# Patient Record
Sex: MALE | Race: WHITE
[De-identification: names, ages, dates, MRNs, and addresses within clinical notes are randomized per-mention and may not be internally consistent; named-entity substitution may affect disease eponyms.]

---

## 2018-11-21 ENCOUNTER — HOSPITAL ENCOUNTER (INPATIENT)
Dept: HOSPITAL 72 - EMR | Age: 73
LOS: 6 days | Discharge: NURSING, REHAB OR LONG-TERM CARE | DRG: 637 | End: 2018-11-27
Payer: MEDICARE

## 2018-11-21 VITALS — DIASTOLIC BLOOD PRESSURE: 72 MMHG | SYSTOLIC BLOOD PRESSURE: 141 MMHG

## 2018-11-21 VITALS — SYSTOLIC BLOOD PRESSURE: 153 MMHG | DIASTOLIC BLOOD PRESSURE: 69 MMHG

## 2018-11-21 VITALS — SYSTOLIC BLOOD PRESSURE: 156 MMHG | DIASTOLIC BLOOD PRESSURE: 70 MMHG

## 2018-11-21 VITALS — WEIGHT: 158 LBS | HEIGHT: 69 IN | BODY MASS INDEX: 23.4 KG/M2

## 2018-11-21 VITALS — DIASTOLIC BLOOD PRESSURE: 76 MMHG | SYSTOLIC BLOOD PRESSURE: 117 MMHG

## 2018-11-21 DIAGNOSIS — K21.9: ICD-10-CM

## 2018-11-21 DIAGNOSIS — F29: ICD-10-CM

## 2018-11-21 DIAGNOSIS — Z79.4: ICD-10-CM

## 2018-11-21 DIAGNOSIS — F20.9: ICD-10-CM

## 2018-11-21 DIAGNOSIS — G93.41: ICD-10-CM

## 2018-11-21 DIAGNOSIS — G93.40: ICD-10-CM

## 2018-11-21 DIAGNOSIS — E87.1: ICD-10-CM

## 2018-11-21 DIAGNOSIS — R53.1: ICD-10-CM

## 2018-11-21 DIAGNOSIS — E03.9: ICD-10-CM

## 2018-11-21 DIAGNOSIS — E11.65: Primary | ICD-10-CM

## 2018-11-21 DIAGNOSIS — E78.5: ICD-10-CM

## 2018-11-21 LAB
ADD MANUAL DIFF: NO
ALBUMIN SERPL-MCNC: 3.4 G/DL (ref 3.4–5)
ALBUMIN/GLOB SERPL: 0.8 {RATIO} (ref 1–2.7)
ALP SERPL-CCNC: 106 U/L (ref 46–116)
ALT SERPL-CCNC: 32 U/L (ref 12–78)
ANION GAP SERPL CALC-SCNC: 10 MMOL/L (ref 5–15)
APPEARANCE UR: CLEAR
APTT PPP: (no result) S
AST SERPL-CCNC: 14 U/L (ref 15–37)
BASOPHILS NFR BLD AUTO: 1 % (ref 0–2)
BILIRUB SERPL-MCNC: 0.2 MG/DL (ref 0.2–1)
BUN SERPL-MCNC: 19 MG/DL (ref 7–18)
CALCIUM SERPL-MCNC: 8.9 MG/DL (ref 8.5–10.1)
CHLORIDE SERPL-SCNC: 97 MMOL/L (ref 98–107)
CK MB SERPL-MCNC: 1.2 NG/ML (ref 0–3.6)
CK SERPL-CCNC: 91 U/L (ref 26–308)
CO2 SERPL-SCNC: 25 MMOL/L (ref 21–32)
CREAT SERPL-MCNC: 1.2 MG/DL (ref 0.55–1.3)
EOSINOPHIL NFR BLD AUTO: 1.6 % (ref 0–3)
ERYTHROCYTE [DISTWIDTH] IN BLOOD BY AUTOMATED COUNT: 10.1 % (ref 11.6–14.8)
GLOBULIN SER-MCNC: 4.4 G/DL
GLUCOSE UR STRIP-MCNC: (no result) MG/DL
HCT VFR BLD CALC: 34.9 % (ref 42–52)
HGB BLD-MCNC: 12.5 G/DL (ref 14.2–18)
KETONES UR QL STRIP: NEGATIVE
LEUKOCYTE ESTERASE UR QL STRIP: NEGATIVE
LYMPHOCYTES NFR BLD AUTO: 31.9 % (ref 20–45)
MCV RBC AUTO: 84 FL (ref 80–99)
MONOCYTES NFR BLD AUTO: 6.5 % (ref 1–10)
NEUTROPHILS NFR BLD AUTO: 59 % (ref 45–75)
NITRITE UR QL STRIP: NEGATIVE
PH UR STRIP: 6 [PH] (ref 4.5–8)
PLATELET # BLD: 180 K/UL (ref 150–450)
POTASSIUM SERPL-SCNC: 3.7 MMOL/L (ref 3.5–5.1)
PROT UR QL STRIP: NEGATIVE
RBC # BLD AUTO: 4.15 M/UL (ref 4.7–6.1)
SODIUM SERPL-SCNC: 132 MMOL/L (ref 136–145)
SP GR UR STRIP: 1 (ref 1–1.03)
UROBILINOGEN UR-MCNC: NORMAL MG/DL (ref 0–1)
WBC # BLD AUTO: 4.4 K/UL (ref 4.8–10.8)

## 2018-11-21 PROCEDURE — 96374 THER/PROPH/DIAG INJ IV PUSH: CPT

## 2018-11-21 PROCEDURE — 83690 ASSAY OF LIPASE: CPT

## 2018-11-21 PROCEDURE — 82550 ASSAY OF CK (CPK): CPT

## 2018-11-21 PROCEDURE — 81003 URINALYSIS AUTO W/O SCOPE: CPT

## 2018-11-21 PROCEDURE — 87081 CULTURE SCREEN ONLY: CPT

## 2018-11-21 PROCEDURE — 85025 COMPLETE CBC W/AUTO DIFF WBC: CPT

## 2018-11-21 PROCEDURE — 93005 ELECTROCARDIOGRAM TRACING: CPT

## 2018-11-21 PROCEDURE — 71045 X-RAY EXAM CHEST 1 VIEW: CPT

## 2018-11-21 PROCEDURE — 80053 COMPREHEN METABOLIC PANEL: CPT

## 2018-11-21 PROCEDURE — 83735 ASSAY OF MAGNESIUM: CPT

## 2018-11-21 PROCEDURE — 84484 ASSAY OF TROPONIN QUANT: CPT

## 2018-11-21 PROCEDURE — 84100 ASSAY OF PHOSPHORUS: CPT

## 2018-11-21 PROCEDURE — 99285 EMERGENCY DEPT VISIT HI MDM: CPT

## 2018-11-21 PROCEDURE — 97803 MED NUTRITION INDIV SUBSEQ: CPT

## 2018-11-21 PROCEDURE — 87040 BLOOD CULTURE FOR BACTERIA: CPT

## 2018-11-21 PROCEDURE — 83036 HEMOGLOBIN GLYCOSYLATED A1C: CPT

## 2018-11-21 PROCEDURE — 82962 GLUCOSE BLOOD TEST: CPT

## 2018-11-21 PROCEDURE — 82553 CREATINE MB FRACTION: CPT

## 2018-11-21 PROCEDURE — 36415 COLL VENOUS BLD VENIPUNCTURE: CPT

## 2018-11-21 RX ADMIN — INSULIN ASPART SCH UNITS: 100 INJECTION, SOLUTION INTRAVENOUS; SUBCUTANEOUS at 21:38

## 2018-11-21 RX ADMIN — ATORVASTATIN CALCIUM SCH MG: 20 TABLET, FILM COATED ORAL at 21:37

## 2018-11-21 NOTE — DIAGNOSTIC IMAGING REPORT
Indication: Shortness of breath

 

Technique: One view of the chest

 

Comparison: none

 

Findings: Lungs and pleural spaces are clear. Heart size is normal.

 

Impression: Negative

## 2018-11-22 VITALS — SYSTOLIC BLOOD PRESSURE: 151 MMHG | DIASTOLIC BLOOD PRESSURE: 89 MMHG

## 2018-11-22 VITALS — DIASTOLIC BLOOD PRESSURE: 73 MMHG | SYSTOLIC BLOOD PRESSURE: 138 MMHG

## 2018-11-22 VITALS — SYSTOLIC BLOOD PRESSURE: 169 MMHG | DIASTOLIC BLOOD PRESSURE: 85 MMHG

## 2018-11-22 VITALS — SYSTOLIC BLOOD PRESSURE: 132 MMHG | DIASTOLIC BLOOD PRESSURE: 73 MMHG

## 2018-11-22 VITALS — SYSTOLIC BLOOD PRESSURE: 153 MMHG | DIASTOLIC BLOOD PRESSURE: 80 MMHG

## 2018-11-22 VITALS — DIASTOLIC BLOOD PRESSURE: 99 MMHG | SYSTOLIC BLOOD PRESSURE: 152 MMHG

## 2018-11-22 RX ADMIN — INSULIN ASPART SCH UNITS: 100 INJECTION, SOLUTION INTRAVENOUS; SUBCUTANEOUS at 14:21

## 2018-11-22 RX ADMIN — INSULIN ASPART SCH UNITS: 100 INJECTION, SOLUTION INTRAVENOUS; SUBCUTANEOUS at 11:38

## 2018-11-22 RX ADMIN — INSULIN ASPART SCH UNITS: 100 INJECTION, SOLUTION INTRAVENOUS; SUBCUTANEOUS at 17:26

## 2018-11-22 RX ADMIN — INSULIN ASPART SCH UNITS: 100 INJECTION, SOLUTION INTRAVENOUS; SUBCUTANEOUS at 17:25

## 2018-11-22 RX ADMIN — ATORVASTATIN CALCIUM SCH MG: 20 TABLET, FILM COATED ORAL at 20:41

## 2018-11-22 RX ADMIN — METOPROLOL SUCCINATE SCH MG: 25 TABLET, EXTENDED RELEASE ORAL at 08:03

## 2018-11-22 RX ADMIN — DOCUSATE SODIUM SCH MG: 100 CAPSULE, LIQUID FILLED ORAL at 08:03

## 2018-11-22 RX ADMIN — INSULIN ASPART SCH UNITS: 100 INJECTION, SOLUTION INTRAVENOUS; SUBCUTANEOUS at 20:41

## 2018-11-22 RX ADMIN — INSULIN DETEMIR SCH UNITS: 100 INJECTION, SOLUTION SUBCUTANEOUS at 14:21

## 2018-11-22 RX ADMIN — INSULIN ASPART SCH UNITS: 100 INJECTION, SOLUTION INTRAVENOUS; SUBCUTANEOUS at 05:40

## 2018-11-22 NOTE — HISTORY AND PHYSICAL REPORT
DATE OF ADMISSION:  11/21/2018

Basically, this is Dr. Jason Shane's patient.  I am covering Dr. Jason Shane.



HISTORY OF PRESENT ILLNESS:  The patient is admitted for elevated blood

sugar.  He is a poor historian and admitted for encephalopathy.  The

patient again is a poor historian, cannot get the information from him,

but the patient's blood sugar was elevated, was altered, and is admitted

for that reason.



PAST MEDICAL HISTORY:  Organic brain syndrome, psychosis, hyperlipidemia,

constipation, NIDDM, hypothyroidism, GERD, and constipation.



PAST SURGICAL HISTORY:  Unable to obtain.



ALLERGIES:  No known allergies.



MEDICATIONS:  Abilify, Lipitor, bisacodyl, Colace, insulin, Levemir,

levothyroxine, omeprazole, Senokot.



FAMILY HISTORY:  Unable to obtain.



SOCIAL HISTORY:  Unable to obtain.



REVIEW OF SYSTEMS:  Poor historian.  Unable to obtain.



PHYSICAL EXAMINATION:

VITAL SIGNS:  Temperature _____ degrees, pulse is 84, and blood pressure

152/99.

HEENT:  PERRLA.

NECK:  Supple.  No lymphadenopathy.

CHEST:  Clear to auscultation.

CARDIOVASCULAR:  Regular rate and rhythm.

GASTROINTESTINAL:  Abdomen is soft.  Positive bowel sounds.

EXTREMITIES:  No edema.  Reflexes on both sides.



LABORATORY AND DIAGNOSTIC DATA:  WBC 4.4, hemoglobin 12.5, and platelets

180,000.  Sodium 133, potassium 3.7, chloride 97, BUN of 19, creatinine of

1.2, glucose 457, and hemoglobin A1c 10.9.



ASSESSMENT AND PLAN:

1. Elevated blood pressure.

2. Altered mental status, most likely due to elevated blood sugar.

3. Hyponatremia.

4. Borderline potassium and elevated BUN.



I have asked Dr. Hastings and Dr. Grace to see the patient for the

above-mentioned diagnoses and treatment.









  ______________________________________________

  Wes Diaz M.D. DR:  EBENEZER

D:  11/22/2018 13:06

T:  11/22/2018 16:19

JOB#:  [id number]

CC:

## 2018-11-23 VITALS — SYSTOLIC BLOOD PRESSURE: 115 MMHG | DIASTOLIC BLOOD PRESSURE: 71 MMHG

## 2018-11-23 VITALS — DIASTOLIC BLOOD PRESSURE: 95 MMHG | SYSTOLIC BLOOD PRESSURE: 142 MMHG

## 2018-11-23 VITALS — DIASTOLIC BLOOD PRESSURE: 85 MMHG | SYSTOLIC BLOOD PRESSURE: 157 MMHG

## 2018-11-23 VITALS — DIASTOLIC BLOOD PRESSURE: 80 MMHG | SYSTOLIC BLOOD PRESSURE: 155 MMHG

## 2018-11-23 RX ADMIN — INSULIN ASPART SCH UNITS: 100 INJECTION, SOLUTION INTRAVENOUS; SUBCUTANEOUS at 16:55

## 2018-11-23 RX ADMIN — INSULIN ASPART SCH UNITS: 100 INJECTION, SOLUTION INTRAVENOUS; SUBCUTANEOUS at 12:03

## 2018-11-23 RX ADMIN — INSULIN ASPART SCH UNITS: 100 INJECTION, SOLUTION INTRAVENOUS; SUBCUTANEOUS at 06:24

## 2018-11-23 RX ADMIN — INSULIN ASPART SCH UNITS: 100 INJECTION, SOLUTION INTRAVENOUS; SUBCUTANEOUS at 06:25

## 2018-11-23 RX ADMIN — ATORVASTATIN CALCIUM SCH MG: 20 TABLET, FILM COATED ORAL at 20:42

## 2018-11-23 RX ADMIN — METOPROLOL SUCCINATE SCH MG: 25 TABLET, EXTENDED RELEASE ORAL at 08:12

## 2018-11-23 RX ADMIN — DOCUSATE SODIUM SCH MG: 100 CAPSULE, LIQUID FILLED ORAL at 08:14

## 2018-11-23 RX ADMIN — INSULIN DETEMIR SCH UNITS: 100 INJECTION, SOLUTION SUBCUTANEOUS at 08:13

## 2018-11-23 RX ADMIN — INSULIN ASPART SCH UNITS: 100 INJECTION, SOLUTION INTRAVENOUS; SUBCUTANEOUS at 20:42

## 2018-11-23 RX ADMIN — INSULIN ASPART SCH UNITS: 100 INJECTION, SOLUTION INTRAVENOUS; SUBCUTANEOUS at 11:57

## 2018-11-23 RX ADMIN — INSULIN ASPART SCH UNITS: 100 INJECTION, SOLUTION INTRAVENOUS; SUBCUTANEOUS at 16:54

## 2018-11-23 NOTE — CONSULTATION
DATE OF CONSULTATION:  11/23/2018

ENDOCRINOLOGY CONSULTATION



CONSULTING PHYSICIAN:  Monty Hastings M.D.



REFERRING PHYSICIAN:  Wes Diaz M.D.



REASON FOR CONSULTATION:  Diabetes management.



HISTORY OF PRESENT ILLNESS:  The patient is a 73-year-old male, who

presented to the hospital with a complaint of change in mental status.  He

comes from nursing facility and the paramedics reported that the patient

was altered for the past two days.  It was difficult to elicit history

from him.  Glucose was elevated and Endocrinology was consulted in order

to assist in the management of diabetes.



PAST MEDICAL HISTORY:

1. Diabetes.

2. Hypothyroidism.

3. Anemia.

4. Hyperlipidemia.



PAST SURGICAL HISTORY:  Unknown.



FAMILY HISTORY:  Noncontributory.



SOCIAL HISTORY:  No smoking, alcohol or drug use.  He lives in a skilled

nursing facility.



LABORATORY DATA:  WBC 4, hemoglobin 12, hematocrit 34.9, and platelets of

180.  Sodium 132, potassium 3.7, chloride 97, bicarbonate 25, BUN 19,

creatinine 1.2, glucose of 457.  A1c of 10.9.



PHYSICAL EXAMINATION:

GENERAL:  Not in apparent distress.

VITAL SIGNS:  Blood pressure 155/80, pulse of 101, temperature of 97.8, and

respiratory rate 18.

HEENT:  Pupils are equal and reactive to light.  Sclerae anicteric.

NECK:  No JVD.

HEART:  Regular.

LUNGS:  Clear.

ABDOMEN:  Positive bowel sounds.

EXTREMITIES:  No clubbing or cyanosis.



DIAGNOSES:

1. Encephalopathy.

2. Diabetes, out of control.

3. Hypothyroidism.



PLAN:

1. Start Levemir 30 units daily.

2. Start NovoLog 8 units before each meal.

3. NovoLog sliding scale before meals and at bedtime.

4. Continue levothyroxine 100 mcg daily.

5. I will follow the patient during hospital stay.



Thank you, Dr. Diaz, for the courtesy of this consultation.









  ______________________________________________

  Monty Hastings M.D.





DR:  RN/ANA MARIA

D:  11/23/2018 08:59

T:  11/23/2018 22:53

JOB#:  [id number]

CC:



BASSAM

## 2018-11-23 NOTE — GENERAL PROGRESS NOTE
Assessment/Plan


Problem List:  


(1) Hyperglycemia


ICD Codes:  R73.9 - Hyperglycemia, unspecified


SNOMED:  47232294


(2) Weakness


ICD Codes:  R53.1 - Weakness


SNOMED:  62407952


(3) Altered level of consciousness


ICD Codes:  R40.4 - Transient alteration of awareness


SNOMED:  5258664


Status:  progressing


Assessment/Plan


afebrile


ams 


elevated sugar is improving


dr cummings for diabetic adjustment





Subjective


ROS Limited/Unobtainable:  Yes


Allergies:  


Coded Allergies:  


     No Known Allergies (Unverified , 11/21/18)





Objective





Last 24 Hour Vital Signs








  Date Time  Temp Pulse Resp B/P (MAP) Pulse Ox O2 Delivery O2 Flow Rate FiO2


 


11/23/18 09:00      Room Air  


 


11/23/18 08:12  101  155/80    


 


11/23/18 07:40 97.9 101  155/80 (105)    


 


11/22/18 20:48      Room Air  


 


11/22/18 20:00 98.6 82 18 151/89 (109) 100   


 


11/22/18 18:00  63  138/73 (94)    


 


11/22/18 16:00 97.5 78 18 169/85 (113) 100   


 


11/22/18 12:00 98.1 98 19 153/80 (104) 98   

















Intake and Output  


 


 11/22/18 11/23/18





 19:00 07:00


 


Intake Total 600 ml 360 ml


 


Balance 600 ml 360 ml


 


  


 


Intake Oral 600 ml 360 ml


 


# Voids 3 2


 


# Bowel Movements 2 








Laboratory Tests


11/22/18 12:20: Hemoglobin A1c 10.9H


Height (Feet):  5


Height (Inches):  9.00


Weight (Pounds):  158


Cardiovascular:  regular rhythm


Respiratory/Chest:  lungs clear


Abdomen:  soft











Wes Diaz MD Nov 23, 2018 11:02

## 2018-11-24 VITALS — DIASTOLIC BLOOD PRESSURE: 84 MMHG | SYSTOLIC BLOOD PRESSURE: 127 MMHG

## 2018-11-24 VITALS — DIASTOLIC BLOOD PRESSURE: 74 MMHG | SYSTOLIC BLOOD PRESSURE: 156 MMHG

## 2018-11-24 RX ADMIN — INSULIN DETEMIR SCH UNITS: 100 INJECTION, SOLUTION SUBCUTANEOUS at 09:00

## 2018-11-24 RX ADMIN — INSULIN ASPART SCH UNITS: 100 INJECTION, SOLUTION INTRAVENOUS; SUBCUTANEOUS at 06:17

## 2018-11-24 RX ADMIN — INSULIN ASPART SCH UNITS: 100 INJECTION, SOLUTION INTRAVENOUS; SUBCUTANEOUS at 16:35

## 2018-11-24 RX ADMIN — DOCUSATE SODIUM SCH MG: 100 CAPSULE, LIQUID FILLED ORAL at 09:00

## 2018-11-24 RX ADMIN — INSULIN ASPART SCH UNITS: 100 INJECTION, SOLUTION INTRAVENOUS; SUBCUTANEOUS at 06:16

## 2018-11-24 RX ADMIN — INSULIN ASPART SCH UNITS: 100 INJECTION, SOLUTION INTRAVENOUS; SUBCUTANEOUS at 11:30

## 2018-11-24 RX ADMIN — INSULIN ASPART SCH UNITS: 100 INJECTION, SOLUTION INTRAVENOUS; SUBCUTANEOUS at 11:43

## 2018-11-24 RX ADMIN — INSULIN DETEMIR SCH UNITS: 100 INJECTION, SOLUTION SUBCUTANEOUS at 21:03

## 2018-11-24 RX ADMIN — ATORVASTATIN CALCIUM SCH MG: 20 TABLET, FILM COATED ORAL at 21:04

## 2018-11-24 RX ADMIN — METOPROLOL SUCCINATE SCH MG: 25 TABLET, EXTENDED RELEASE ORAL at 09:00

## 2018-11-24 RX ADMIN — INSULIN ASPART SCH UNITS: 100 INJECTION, SOLUTION INTRAVENOUS; SUBCUTANEOUS at 21:03

## 2018-11-24 RX ADMIN — INSULIN ASPART SCH UNITS: 100 INJECTION, SOLUTION INTRAVENOUS; SUBCUTANEOUS at 16:30

## 2018-11-24 NOTE — GENERAL PROGRESS NOTE
Assessment/Plan


Problem List:  


(1) Altered level of consciousness


ICD Codes:  R40.4 - Transient alteration of awareness


SNOMED:  9312903


(2) Hyperglycemia


ICD Codes:  R73.9 - Hyperglycemia, unspecified


SNOMED:  10560020


(3) Weakness


ICD Codes:  R53.1 - Weakness


SNOMED:  15707713


Assessment/Plan


change Levemir to 30 units qhs 


continue Novolog 8 units ac tid + SSI ac / hs





Subjective


Allergies:  


Coded Allergies:  


     No Known Allergies (Unverified , 11/21/18)


All Systems:  reviewed and negative except above


Subjective


events noted 


refusing insulin





Objective





Last 24 Hour Vital Signs








  Date Time  Temp Pulse Resp B/P (MAP) Pulse Ox O2 Delivery O2 Flow Rate FiO2


 


11/24/18 09:00      Room Air  


 


11/24/18 04:00 97.9 74 20 127/84 (98) 100   


 


11/24/18 00:00 97.8 67 19 156/74 (101) 100   


 


11/23/18 20:44      Room Air  


 


11/23/18 20:00 97.2 77 20 157/85 (109) 94   


 


11/23/18 16:00 97.7 78 18 115/71 (86) 95   


 


11/23/18 12:00 98.4 69 18 142/95 (111) 98   

















Intake and Output  


 


 11/23/18 11/24/18





 19:00 07:00


 


Intake Total 840 ml 960 ml


 


Output Total  1100 ml


 


Balance 840 ml -140 ml


 


  


 


Intake Oral 840 ml 960 ml


 


Output Urine Total  1100 ml


 


# Voids 2 








Height (Feet):  5


Height (Inches):  9.00


Weight (Pounds):  158


General Appearance:  no apparent distress


Neck:  normal alignment


Respiratory/Chest:  lungs clear


Abdomen:  normal bowel sounds











Monty Hastings MD Nov 24, 2018 12:01

## 2018-11-24 NOTE — GENERAL PROGRESS NOTE
Assessment/Plan


Problem List:  


(1) Hyperglycemia


ICD Codes:  R73.9 - Hyperglycemia, unspecified


SNOMED:  60845315


(2) Weakness


ICD Codes:  R53.1 - Weakness


SNOMED:  85803583


(3) Altered level of consciousness


ICD Codes:  R40.4 - Transient alteration of awareness


SNOMED:  9348253


Status:  progressing


Assessment/Plan


nac


ams 


elevated sugar is improving





Subjective


ROS Limited/Unobtainable:  Yes


Allergies:  


Coded Allergies:  


     No Known Allergies (Unverified , 11/21/18)





Objective





Last 24 Hour Vital Signs








  Date Time  Temp Pulse Resp B/P (MAP) Pulse Ox O2 Delivery O2 Flow Rate FiO2


 


11/24/18 09:00      Room Air  


 


11/24/18 04:00 97.9 74 20 127/84 (98) 100   


 


11/24/18 00:00 97.8 67 19 156/74 (101) 100   


 


11/23/18 20:44      Room Air  


 


11/23/18 20:00 97.2 77 20 157/85 (109) 94   

















Intake and Output  


 


 11/23/18 11/24/18





 19:00 07:00


 


Intake Total 840 ml 960 ml


 


Output Total  1100 ml


 


Balance 840 ml -140 ml


 


  


 


Intake Oral 840 ml 960 ml


 


Output Urine Total  1100 ml


 


# Voids 2 








Height (Feet):  5


Height (Inches):  9.00


Weight (Pounds):  158


Neck:  supple


Cardiovascular:  normal peripheral pulses


Respiratory/Chest:  lungs clear


Abdomen:  soft











Wes Diaz MD Nov 24, 2018 16:31

## 2018-11-25 VITALS — SYSTOLIC BLOOD PRESSURE: 153 MMHG | DIASTOLIC BLOOD PRESSURE: 87 MMHG

## 2018-11-25 RX ADMIN — INSULIN ASPART SCH UNITS: 100 INJECTION, SOLUTION INTRAVENOUS; SUBCUTANEOUS at 06:12

## 2018-11-25 RX ADMIN — INSULIN ASPART SCH UNITS: 100 INJECTION, SOLUTION INTRAVENOUS; SUBCUTANEOUS at 11:30

## 2018-11-25 RX ADMIN — INSULIN ASPART SCH UNITS: 100 INJECTION, SOLUTION INTRAVENOUS; SUBCUTANEOUS at 16:50

## 2018-11-25 RX ADMIN — DOCUSATE SODIUM SCH MG: 100 CAPSULE, LIQUID FILLED ORAL at 09:35

## 2018-11-25 RX ADMIN — INSULIN ASPART SCH UNITS: 100 INJECTION, SOLUTION INTRAVENOUS; SUBCUTANEOUS at 21:45

## 2018-11-25 RX ADMIN — INSULIN DETEMIR SCH UNITS: 100 INJECTION, SOLUTION SUBCUTANEOUS at 21:44

## 2018-11-25 RX ADMIN — METOPROLOL SUCCINATE SCH MG: 25 TABLET, EXTENDED RELEASE ORAL at 09:36

## 2018-11-25 RX ADMIN — ATORVASTATIN CALCIUM SCH MG: 20 TABLET, FILM COATED ORAL at 21:31

## 2018-11-25 RX ADMIN — INSULIN ASPART SCH UNITS: 100 INJECTION, SOLUTION INTRAVENOUS; SUBCUTANEOUS at 11:50

## 2018-11-25 RX ADMIN — INSULIN ASPART SCH UNITS: 100 INJECTION, SOLUTION INTRAVENOUS; SUBCUTANEOUS at 16:30

## 2018-11-25 NOTE — GENERAL PROGRESS NOTE
Assessment/Plan


Problem List:  


(1) Altered level of consciousness


ICD Codes:  R40.4 - Transient alteration of awareness


SNOMED:  2227679


(2) Hyperglycemia


ICD Codes:  R73.9 - Hyperglycemia, unspecified


SNOMED:  51060269


(3) Weakness


ICD Codes:  R53.1 - Weakness


SNOMED:  83090343


Assessment/Plan


continue Levemir 30 units qhs 


continue Novolog 8 units ac tid + SSI ac / hs





Subjective


ROS Limited/Unobtainable:  Yes


Allergies:  


Coded Allergies:  


     No Known Allergies (Unverified , 11/21/18)


Subjective


events noted 


non compliant 


walking in the hallway





Objective





Last 24 Hour Vital Signs








  Date Time  Temp Pulse Resp B/P (MAP) Pulse Ox O2 Delivery O2 Flow Rate FiO2


 


11/25/18 09:36  89  155/83    


 


11/25/18 04:00 97.9 73 19 153/87 (109) 100   


 


11/24/18 21:00      Room Air  

















Intake and Output  


 


 11/24/18 11/25/18





 19:00 07:00


 


Intake Total 1200 ml 240 ml


 


Balance 1200 ml 240 ml


 


  


 


Intake Oral 1200 ml 240 ml


 


# Voids  4








Height (Feet):  5


Height (Inches):  9.00


Weight (Pounds):  158


General Appearance:  no apparent distress


Neck:  normal alignment


Cardiovascular:  regular rhythm


Respiratory/Chest:  lungs clear


Abdomen:  normal bowel sounds


Objective





Current Medications








 Medications


  (Trade)  Dose


 Ordered  Sig/Jose


 Route


 PRN Reason  Start Time


 Stop Time Status Last Admin


Dose Admin


 


 Acetaminophen


  (Tylenol)  325 mg  Q4H  PRN


 ORAL


 MILD PAIN / T> 100.5  11/21/18 20:45


 12/21/18 20:44   


 


 


 Atorvastatin


 Calcium


  (Lipitor)  20 mg  BEDTIME


 ORAL


   11/21/18 21:00


 12/21/18 20:59  11/24/18 21:04


 


 


 Bisacodyl


  (Dulcolax)  5 mg  DAILYPRN  PRN


 ORAL


 Constipation  11/21/18 20:45


 12/21/18 20:44   


 


 


 Dextrose


  (Dextrose 50%)  25 ml  Q30M  PRN


 IV


 Hypoglycemia  11/21/18 20:45


 12/21/18 20:44   


 


 


 Dextrose


  (Dextrose 50%)  50 ml  Q30M  PRN


 IV


 Hypoglycemia  11/21/18 20:45


 12/21/18 20:44   


 


 


 Docusate Sodium


  (Colace)  100 mg  DAILY


 ORAL


   11/22/18 09:00


 12/22/18 08:59  11/25/18 09:35


 


 


 Insulin Aspart


  (NovoLOG)    BEFORE MEALS AND  HS


 SUBQ


   11/21/18 22:00


 12/21/18 21:59  11/24/18 21:03


 


 


 Insulin Aspart


  (NovoLOG)  8 units  NOVOTIAC


 SUBQ


   11/22/18 12:50


 12/22/18 12:49  11/24/18 06:16


 


 


 Insulin Detemir


  (Levemir)  30 units  QHS


 SUBQ


   11/24/18 21:00


 12/22/18 13:59  11/24/18 21:03


 


 


 Levothyroxine


 Sodium


  (Synthroid)  100 mcg  ACBREAKFAST


 ORAL


   11/22/18 06:30


 12/22/18 06:29  11/25/18 06:12


 


 


 Magnesium


 Hydroxide


  (Mom)  30 ml  DAILYPRN  PRN


 ORAL


 Constipation  11/21/18 20:45


 12/21/18 20:44   


 


 


 Metoprolol


 Succinate


  (Toprol XL)  25 mg  DAILY


 ORAL


   11/22/18 09:00


 12/22/18 08:59  11/25/18 09:36


 


 


 Sennosides


  (Senokot)  8.6 mg  DAILYPRN  PRN


 ORAL


 Constipation  11/21/18 20:45


 12/21/18 20:44   


 


 


 Sodium Phosphate


  (Fleet's Sodium


 Phosl Enema)  133 ml  DAILYPRN  PRN


 RECTAL


 constipation  11/21/18 21:00


 12/21/18 20:59   


 














Item Value  Date Time


 


  3/1/92 0000


 


Bedside Blood Glucose 411 mg/dl H 11/24/18 0652


 


Bedside Blood Glucose 71 mg/dl 11/23/18 2118


 


Bedside Blood Glucose 292 mg/dl H 11/23/18 1700


 


Bedside Blood Glucose 177 mg/dl H 11/23/18 1203


 


Bedside Blood Glucose 251 mg/dl H 11/23/18 0813


 


Bedside Blood Glucose 183 mg/dl H 11/25/18 0630


 


Bedside Blood Glucose 336 mg/dl H 11/24/18 2103

















Monty Hastings MD Nov 25, 2018 11:16

## 2018-11-26 VITALS — DIASTOLIC BLOOD PRESSURE: 73 MMHG | SYSTOLIC BLOOD PRESSURE: 135 MMHG

## 2018-11-26 RX ADMIN — METOPROLOL SUCCINATE SCH MG: 25 TABLET, EXTENDED RELEASE ORAL at 08:58

## 2018-11-26 RX ADMIN — INSULIN ASPART SCH UNITS: 100 INJECTION, SOLUTION INTRAVENOUS; SUBCUTANEOUS at 16:30

## 2018-11-26 RX ADMIN — INSULIN ASPART SCH UNITS: 100 INJECTION, SOLUTION INTRAVENOUS; SUBCUTANEOUS at 11:30

## 2018-11-26 RX ADMIN — ATORVASTATIN CALCIUM SCH MG: 20 TABLET, FILM COATED ORAL at 20:22

## 2018-11-26 RX ADMIN — INSULIN DETEMIR SCH UNITS: 100 INJECTION, SOLUTION SUBCUTANEOUS at 20:22

## 2018-11-26 RX ADMIN — INSULIN ASPART SCH UNITS: 100 INJECTION, SOLUTION INTRAVENOUS; SUBCUTANEOUS at 06:30

## 2018-11-26 RX ADMIN — DOCUSATE SODIUM SCH MG: 100 CAPSULE, LIQUID FILLED ORAL at 08:58

## 2018-11-26 RX ADMIN — INSULIN ASPART SCH UNITS: 100 INJECTION, SOLUTION INTRAVENOUS; SUBCUTANEOUS at 11:50

## 2018-11-26 RX ADMIN — INSULIN ASPART SCH UNITS: 100 INJECTION, SOLUTION INTRAVENOUS; SUBCUTANEOUS at 16:50

## 2018-11-26 RX ADMIN — INSULIN ASPART SCH UNITS: 100 INJECTION, SOLUTION INTRAVENOUS; SUBCUTANEOUS at 20:23

## 2018-11-26 NOTE — PULMONOLOGY PROGRESS NOTE
Assessment/Plan


Problems:  


(1) Altered level of consciousness


(2) Hyperglycemia


(3) Psychosis


(4) Weakness


(5) History of hypertension


Assessment/Plan


sliding scale


diabetic diet


psych evaluation


monitor BP


symptomatic treatment


dvt  prophylaxis





Subjective


ROS Limited/Unobtainable:  No


Interval Events:  walking in the hallway


Allergies:  


Coded Allergies:  


     No Known Allergies (Unverified , 11/21/18)





Objective





Last 24 Hour Vital Signs








  Date Time  Temp Pulse Resp B/P (MAP) Pulse Ox O2 Delivery O2 Flow Rate FiO2


 


11/26/18 09:00      Room Air  


 


11/26/18 08:58  80  144/72    


 


11/25/18 21:00      Room Air  

















Intake and Output  


 


 11/25/18 11/26/18





 19:00 07:00


 


Intake Total 960 ml 1200 ml


 


Balance 960 ml 1200 ml


 


  


 


Intake Oral 960 ml 1200 ml


 


# Voids  2








General Appearance:  WD/WN


HEENT:  normocephalic, atraumatic


Respiratory/Chest:  chest wall non-tender, lungs clear


Cardiovascular:  normal peripheral pulses, normal rate


Abdomen:  normal bowel sounds, soft, non tender


Genitourinary:  normal external genitalia


Extremities:  no cyanosis


Neurologic/Psychiatric:  CNs II-XII grossly normal


Lymphatic:  no neck adenopathy





Current Medications








 Medications


  (Trade)  Dose


 Ordered  Sig/Jose


 Route


 PRN Reason  Start Time


 Stop Time Status Last Admin


Dose Admin


 


 Acetaminophen


  (Tylenol)  325 mg  Q4H  PRN


 ORAL


 MILD PAIN / T> 100.5  11/21/18 20:45


 12/21/18 20:44   


 


 


 Atorvastatin


 Calcium


  (Lipitor)  20 mg  BEDTIME


 ORAL


   11/21/18 21:00


 12/21/18 20:59  11/25/18 21:31


 


 


 Bisacodyl


  (Dulcolax)  5 mg  DAILYPRN  PRN


 ORAL


 Constipation  11/21/18 20:45


 12/21/18 20:44   


 


 


 Dextrose


  (Dextrose 50%)  25 ml  Q30M  PRN


 IV


 Hypoglycemia  11/21/18 20:45


 12/21/18 20:44   


 


 


 Dextrose


  (Dextrose 50%)  50 ml  Q30M  PRN


 IV


 Hypoglycemia  11/21/18 20:45


 12/21/18 20:44   


 


 


 Docusate Sodium


  (Colace)  100 mg  DAILY


 ORAL


   11/22/18 09:00


 12/22/18 08:59  11/26/18 08:58


 


 


 Insulin Aspart


  (NovoLOG)    BEFORE MEALS AND  HS


 SUBQ


   11/21/18 22:00


 12/21/18 21:59  11/25/18 21:45


 


 


 Insulin Aspart


  (NovoLOG)  8 units  NOVOTIAC


 SUBQ


   11/22/18 12:50


 12/22/18 12:49  11/24/18 06:16


 


 


 Insulin Detemir


  (Levemir)  30 units  QHS


 SUBQ


   11/24/18 21:00


 12/22/18 13:59  11/25/18 21:44


 


 


 Levothyroxine


 Sodium


  (Synthroid)  100 mcg  ACBREAKFAST


 ORAL


   11/22/18 06:30


 12/22/18 06:29  11/25/18 06:12


 


 


 Lorazepam


  (Ativan)  2 mg  Q6H  PRN


 ORAL


 For Anxiety  11/26/18 13:00


 12/3/18 12:59   


 


 


 Magnesium


 Hydroxide


  (Mom)  30 ml  DAILYPRN  PRN


 ORAL


 Constipation  11/21/18 20:45


 12/21/18 20:44   


 


 


 Metoprolol


 Succinate


  (Toprol XL)  25 mg  DAILY


 ORAL


   11/22/18 09:00


 12/22/18 08:59  11/26/18 08:58


 


 


 Risperidone


  (RisperDAL)  1 mg  BID


 ORAL


   11/26/18 13:00


 12/26/18 12:59   


 


 


 Sennosides


  (Senokot)  8.6 mg  DAILYPRN  PRN


 ORAL


 Constipation  11/21/18 20:45


 12/21/18 20:44   


 


 


 Sodium Phosphate


  (Fleet's Sodium


 Phosl Enema)  133 ml  DAILYPRN  PRN


 RECTAL


 constipation  11/21/18 21:00


 12/21/18 20:59   


 

















Brenton Rosa MD Nov 26, 2018 13:28

## 2018-11-26 NOTE — CARDIOLOGY REPORT
--------------- APPROVED REPORT --------------





EKG Measurement

Heart [id number]

CA [id number]

[id number]

[id number]

[id number]





Normal sinus rhythm

Normal ECG

## 2018-11-26 NOTE — GENERAL PROGRESS NOTE
Assessment/Plan


Problem List:  


(1) Altered level of consciousness


ICD Codes:  R40.4 - Transient alteration of awareness


SNOMED:  7982514


(2) Hyperglycemia


ICD Codes:  R73.9 - Hyperglycemia, unspecified


SNOMED:  35249557


(3) Weakness


ICD Codes:  R53.1 - Weakness


SNOMED:  83739808


Status:  stable, progressing


Assessment/Plan


ot pt diet bs bp control cbc bmp am psyc eval dc planning





Subjective


Constitutional:  Reports: weakness


Allergies:  


Coded Allergies:  


     No Known Allergies (Unverified , 11/21/18)


All Systems:  reviewed and negative except above


Subjective


confused in room





Objective





Last 24 Hour Vital Signs








  Date Time  Temp Pulse Resp B/P (MAP) Pulse Ox O2 Delivery O2 Flow Rate FiO2


 


11/26/18 09:00      Room Air  


 


11/26/18 08:58  80  144/72    


 


11/25/18 21:00      Room Air  

















Intake and Output  


 


 11/25/18 11/26/18





 19:00 07:00


 


Intake Total 960 ml 1200 ml


 


Balance 960 ml 1200 ml


 


  


 


Intake Oral 960 ml 1200 ml


 


# Voids  2








Height (Feet):  5


Height (Inches):  9.00


Weight (Pounds):  158


General Appearance:  lethargic


EENT:  normal ENT inspection


Neck:  normal alignment


Cardiovascular:  normal peripheral pulses, normal rate, regular rhythm


Respiratory/Chest:  chest wall non-tender, lungs clear, normal breath sounds


Abdomen:  normal bowel sounds, non tender, soft


Extremities:  normal inspection


Edema:  no edema noted Arm (L), no edema noted Arm (R), no edema noted Leg (L), 

no edema noted Leg (R), no edema noted Pedal (L), no edema noted Pedal (R), no 

edema noted Generalized


Neurologic:  motor weakness


Skin:  normal pigmentation, warm/dry











Jason Shane DO Nov 26, 2018 13:09

## 2018-11-26 NOTE — CONSULTATION
History of Present Illness


General


Date patient seen:  Nov 26, 2018


Chief Complaint:  Altered Level of Consciousness





Present Illness


HPI


73-year-old male, who


presented to the hospital with a complaint of change in mental status.  He


comes from nursing facility and the paramedics reported that the patient


was altered for the past two days. the pt has been disorganized and agitated. 

the pt is delusional and is unable to provide any hx


Allergies:  


Coded Allergies:  


     No Known Allergies (Unverified , 11/21/18)





Medication History


Scheduled


Aripiprazole* (Abilify*), 400 MG ORAL DAILY, (Reported)


Atorvastatin Calcium* (Atorvastatin Calcium*), 20 MG ORAL BEDTIME, (Reported)


Bisacodyl* (Dulcolax*), 10 MG ORAL ONCE, (Reported)


Docusate Sodium* (Colace*), 100 MG ORAL DAILY, (Reported)


Insulin Glargine (Lantus), 28 SUBQ BEDTIME, (Reported)


Levothyroxine Sodium* (Synthroid*), 100 MCG ORAL DAILY, (Reported)


Magnesium Hydroxide* (Milk Of Magnesia*), 30 ML ORAL DAILY, (Reported)


Metoprolol Succinate* (Metoprolol Succinate*), 25 MG ORAL DAILY, (Reported)


Na Phos,M-B/Na Phos,Di-Ba* (Fleet Enema*), 133 ML RECTAL DAILY, (Reported)


Omeprazole (Omeprazole), 40 MG ORAL DAILY, (Reported)





Scheduled PRN


Acetaminophen* (Acetaminophen 325MG Tablet*), 325 MG ORAL Q4H PRN for For Pain, 

(Reported)





Miscellaneous Medications


Glucagon,Human Recombinant (Glucagen), 1 MG IJ, (Reported)


Sennosides (Senna), 100 MG PO, (Reported)


Sennosides (Senna), 100 MCG PO, (Reported)





Discontinued Medications


Acetaminophen* (Tylenol*), 325 MG PO Q4H PRN for Mild Pain/Temp > 100.5, (

Reported)


   Discontinued Reason: Prescription changed





Patient History


Healthcare decision maker





Resuscitation status


Full Code


Advanced Directive on File


No





Past Medical/Surgical History


Past Medical/Surgical History:  


(1) Altered level of consciousness


(2) Hyperglycemia


(3) Weakness


(4) Psychosis


(5) History of hypertension





Review of Systems


Psychiatric:  Reports: prior hx, anxiety, depressed feelings, emotional problems

, hallucinations





Physical Exam


General Appearance:  alert, confused, agitated





Last 24 Hour Vital Signs








  Date Time  Temp Pulse Resp B/P (MAP) Pulse Ox O2 Delivery O2 Flow Rate FiO2


 


11/26/18 20:48      Room Air  


 


11/26/18 16:00 98.9 78 19 135/73 (93) 100   


 


11/26/18 09:00      Room Air  


 


11/26/18 08:58  80  144/72    

















Intake and Output  


 


 11/25/18 11/26/18





 19:00 07:00


 


Intake Total 960 ml 1200 ml


 


Balance 960 ml 1200 ml


 


  


 


Intake Oral 960 ml 1200 ml


 


# Voids  2








Height (Feet):  5


Height (Inches):  9.00


Weight (Pounds):  158


Medications





Current Medications








 Medications


  (Trade)  Dose


 Ordered  Sig/Jose


 Route


 PRN Reason  Start Time


 Stop Time Status Last Admin


Dose Admin


 


 Acetaminophen


  (Tylenol)  325 mg  Q4H  PRN


 ORAL


 MILD PAIN / T> 100.5  11/21/18 20:45


 12/21/18 20:44   


 


 


 Atorvastatin


 Calcium


  (Lipitor)  20 mg  BEDTIME


 ORAL


   11/21/18 21:00


 12/21/18 20:59  11/26/18 20:22


 


 


 Bisacodyl


  (Dulcolax)  5 mg  DAILYPRN  PRN


 ORAL


 Constipation  11/21/18 20:45


 12/21/18 20:44   


 


 


 Dextrose


  (Dextrose 50%)  25 ml  Q30M  PRN


 IV


 Hypoglycemia  11/21/18 20:45


 12/21/18 20:44   


 


 


 Dextrose


  (Dextrose 50%)  50 ml  Q30M  PRN


 IV


 Hypoglycemia  11/21/18 20:45


 12/21/18 20:44   


 


 


 Docusate Sodium


  (Colace)  100 mg  DAILY


 ORAL


   11/22/18 09:00


 12/22/18 08:59  11/26/18 08:58


 


 


 Insulin Aspart


  (NovoLOG)    BEFORE MEALS AND  HS


 SUBQ


   11/21/18 22:00


 12/21/18 21:59  11/25/18 21:45


 


 


 Insulin Aspart


  (NovoLOG)  8 units  NOVOTIAC


 SUBQ


   11/22/18 12:50


 12/22/18 12:49  11/24/18 06:16


 


 


 Insulin Detemir


  (Levemir)  30 units  QHS


 SUBQ


   11/24/18 21:00


 12/22/18 13:59  11/25/18 21:44


 


 


 Levothyroxine


 Sodium


  (Synthroid)  100 mcg  ACBREAKFAST


 ORAL


   11/22/18 06:30


 12/22/18 06:29  11/25/18 06:12


 


 


 Lorazepam


  (Ativan)  2 mg  Q6H  PRN


 ORAL


 For Anxiety  11/26/18 13:00


 12/3/18 12:59   


 


 


 Magnesium


 Hydroxide


  (Mom)  30 ml  DAILYPRN  PRN


 ORAL


 Constipation  11/21/18 20:45


 12/21/18 20:44   


 


 


 Metoprolol


 Succinate


  (Toprol XL)  25 mg  DAILY


 ORAL


   11/22/18 09:00


 12/22/18 08:59  11/26/18 08:58


 


 


 Risperidone


  (RisperDAL)  1 mg  BID


 ORAL


   11/26/18 13:00


 12/26/18 12:59  11/26/18 17:08


 


 


 Sennosides


  (Senokot)  8.6 mg  DAILYPRN  PRN


 ORAL


 Constipation  11/21/18 20:45


 12/21/18 20:44   


 


 


 Sodium Phosphate


  (Fleet's Sodium


 Phosl Enema)  133 ml  DAILYPRN  PRN


 RECTAL


 constipation  11/21/18 21:00


 12/21/18 20:59   


 











Assessment/Plan


Problem List:  


(1) Psychosis


ICD Codes:  F29 - Unspecified psychosis not due to a substance or known 

physiological condition


SNOMED:  [id number]


Status:  stable, progressing


Assessment/Plan


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


agitation











Theodore Mays MD Nov 26, 2018 23:03

## 2018-11-26 NOTE — GENERAL PROGRESS NOTE
Assessment/Plan


Problem List:  


(1) Altered level of consciousness


ICD Codes:  R40.4 - Transient alteration of awareness


SNOMED:  2203895


(2) Hyperglycemia


ICD Codes:  R73.9 - Hyperglycemia, unspecified


SNOMED:  40121559


(3) Weakness


ICD Codes:  R53.1 - Weakness


SNOMED:  31864116


Assessment/Plan


continue Levemir 30 units qhs 


continue Novolog 8 units ac tid + SSI ac / hs





Subjective


Allergies:  


Coded Allergies:  


     No Known Allergies (Unverified , 11/21/18)


All Systems:  reviewed and negative except above


Subjective


events noted 


non compliant





Objective





Last 24 Hour Vital Signs








  Date Time  Temp Pulse Resp B/P (MAP) Pulse Ox O2 Delivery O2 Flow Rate FiO2


 


11/26/18 09:00      Room Air  


 


11/26/18 08:58  80  144/72    


 


11/25/18 21:00      Room Air  

















Intake and Output  


 


 11/25/18 11/26/18





 19:00 07:00


 


Intake Total 960 ml 1200 ml


 


Balance 960 ml 1200 ml


 


  


 


Intake Oral 960 ml 1200 ml


 


# Voids  2








Height (Feet):  5


Height (Inches):  9.00


Weight (Pounds):  158


General Appearance:  no apparent distress


Neck:  normal alignment


Cardiovascular:  normal rate


Respiratory/Chest:  lungs clear


Abdomen:  normal bowel sounds


Pelvis:  normal external exam


Objective





Current Medications








 Medications


  (Trade)  Dose


 Ordered  Sig/Jose


 Route


 PRN Reason  Start Time


 Stop Time Status Last Admin


Dose Admin


 


 Acetaminophen


  (Tylenol)  325 mg  Q4H  PRN


 ORAL


 MILD PAIN / T> 100.5  11/21/18 20:45


 12/21/18 20:44   


 


 


 Atorvastatin


 Calcium


  (Lipitor)  20 mg  BEDTIME


 ORAL


   11/21/18 21:00


 12/21/18 20:59  11/25/18 21:31


 


 


 Bisacodyl


  (Dulcolax)  5 mg  DAILYPRN  PRN


 ORAL


 Constipation  11/21/18 20:45


 12/21/18 20:44   


 


 


 Dextrose


  (Dextrose 50%)  25 ml  Q30M  PRN


 IV


 Hypoglycemia  11/21/18 20:45


 12/21/18 20:44   


 


 


 Dextrose


  (Dextrose 50%)  50 ml  Q30M  PRN


 IV


 Hypoglycemia  11/21/18 20:45


 12/21/18 20:44   


 


 


 Docusate Sodium


  (Colace)  100 mg  DAILY


 ORAL


   11/22/18 09:00


 12/22/18 08:59  11/26/18 08:58


 


 


 Insulin Aspart


  (NovoLOG)    BEFORE MEALS AND  HS


 SUBQ


   11/21/18 22:00


 12/21/18 21:59  11/25/18 21:45


 


 


 Insulin Aspart


  (NovoLOG)  8 units  NOVOTIAC


 SUBQ


   11/22/18 12:50


 12/22/18 12:49  11/24/18 06:16


 


 


 Insulin Detemir


  (Levemir)  30 units  QHS


 SUBQ


   11/24/18 21:00


 12/22/18 13:59  11/25/18 21:44


 


 


 Levothyroxine


 Sodium


  (Synthroid)  100 mcg  ACBREAKFAST


 ORAL


   11/22/18 06:30


 12/22/18 06:29  11/25/18 06:12


 


 


 Lorazepam


  (Ativan)  2 mg  Q6H  PRN


 ORAL


 For Anxiety  11/26/18 13:00


 12/3/18 12:59   


 


 


 Magnesium


 Hydroxide


  (Mom)  30 ml  DAILYPRN  PRN


 ORAL


 Constipation  11/21/18 20:45


 12/21/18 20:44   


 


 


 Metoprolol


 Succinate


  (Toprol XL)  25 mg  DAILY


 ORAL


   11/22/18 09:00


 12/22/18 08:59  11/26/18 08:58


 


 


 Risperidone


  (RisperDAL)  1 mg  BID


 ORAL


   11/26/18 13:00


 12/26/18 12:59  11/26/18 13:40


 


 


 Sennosides


  (Senokot)  8.6 mg  DAILYPRN  PRN


 ORAL


 Constipation  11/21/18 20:45


 12/21/18 20:44   


 


 


 Sodium Phosphate


  (Fleet's Sodium


 Phosl Enema)  133 ml  DAILYPRN  PRN


 RECTAL


 constipation  11/21/18 21:00


 12/21/18 20:59   


 














Item Value  Date Time


 


Bedside Blood Glucose 429 mg/dl H 11/25/18 2145

















Monty Hastings MD Nov 26, 2018 14:41

## 2018-11-27 VITALS — DIASTOLIC BLOOD PRESSURE: 72 MMHG | SYSTOLIC BLOOD PRESSURE: 150 MMHG

## 2018-11-27 VITALS — DIASTOLIC BLOOD PRESSURE: 75 MMHG | SYSTOLIC BLOOD PRESSURE: 150 MMHG

## 2018-11-27 VITALS — DIASTOLIC BLOOD PRESSURE: 79 MMHG | SYSTOLIC BLOOD PRESSURE: 152 MMHG

## 2018-11-27 VITALS — SYSTOLIC BLOOD PRESSURE: 150 MMHG | DIASTOLIC BLOOD PRESSURE: 80 MMHG

## 2018-11-27 LAB
ADD MANUAL DIFF: NO
ALBUMIN SERPL-MCNC: 3.2 G/DL (ref 3.4–5)
ALBUMIN/GLOB SERPL: 0.8 {RATIO} (ref 1–2.7)
ALP SERPL-CCNC: 83 U/L (ref 46–116)
ALT SERPL-CCNC: 35 U/L (ref 12–78)
ANION GAP SERPL CALC-SCNC: 7 MMOL/L (ref 5–15)
AST SERPL-CCNC: 17 U/L (ref 15–37)
BASOPHILS NFR BLD AUTO: 0.8 % (ref 0–2)
BILIRUB SERPL-MCNC: 0.3 MG/DL (ref 0.2–1)
BUN SERPL-MCNC: 27 MG/DL (ref 7–18)
CALCIUM SERPL-MCNC: 8.9 MG/DL (ref 8.5–10.1)
CHLORIDE SERPL-SCNC: 101 MMOL/L (ref 98–107)
CK SERPL-CCNC: 61 U/L (ref 26–308)
CO2 SERPL-SCNC: 28 MMOL/L (ref 21–32)
CREAT SERPL-MCNC: 1.3 MG/DL (ref 0.55–1.3)
EOSINOPHIL NFR BLD AUTO: 2.7 % (ref 0–3)
ERYTHROCYTE [DISTWIDTH] IN BLOOD BY AUTOMATED COUNT: 11.1 % (ref 11.6–14.8)
GLOBULIN SER-MCNC: 4.1 G/DL
HCT VFR BLD CALC: 34.9 % (ref 42–52)
HGB BLD-MCNC: 11.7 G/DL (ref 14.2–18)
LYMPHOCYTES NFR BLD AUTO: 33 % (ref 20–45)
MCV RBC AUTO: 86 FL (ref 80–99)
MONOCYTES NFR BLD AUTO: 7.6 % (ref 1–10)
NEUTROPHILS NFR BLD AUTO: 55.9 % (ref 45–75)
PHOSPHATE SERPL-MCNC: 4.4 MG/DL (ref 2.5–4.9)
PLATELET # BLD: 174 K/UL (ref 150–450)
POTASSIUM SERPL-SCNC: 4.4 MMOL/L (ref 3.5–5.1)
RBC # BLD AUTO: 4.05 M/UL (ref 4.7–6.1)
SODIUM SERPL-SCNC: 136 MMOL/L (ref 136–145)
WBC # BLD AUTO: 4.9 K/UL (ref 4.8–10.8)

## 2018-11-27 RX ADMIN — METOPROLOL SUCCINATE SCH MG: 25 TABLET, EXTENDED RELEASE ORAL at 09:05

## 2018-11-27 RX ADMIN — INSULIN ASPART SCH UNITS: 100 INJECTION, SOLUTION INTRAVENOUS; SUBCUTANEOUS at 06:24

## 2018-11-27 RX ADMIN — DOCUSATE SODIUM SCH MG: 100 CAPSULE, LIQUID FILLED ORAL at 08:58

## 2018-11-27 RX ADMIN — INSULIN ASPART SCH UNITS: 100 INJECTION, SOLUTION INTRAVENOUS; SUBCUTANEOUS at 17:02

## 2018-11-27 RX ADMIN — INSULIN ASPART SCH UNITS: 100 INJECTION, SOLUTION INTRAVENOUS; SUBCUTANEOUS at 12:08

## 2018-11-27 RX ADMIN — INSULIN ASPART SCH UNITS: 100 INJECTION, SOLUTION INTRAVENOUS; SUBCUTANEOUS at 16:50

## 2018-11-27 RX ADMIN — DOCUSATE SODIUM SCH MG: 100 CAPSULE, LIQUID FILLED ORAL at 09:00

## 2018-11-27 RX ADMIN — INSULIN ASPART SCH UNITS: 100 INJECTION, SOLUTION INTRAVENOUS; SUBCUTANEOUS at 06:25

## 2018-11-27 RX ADMIN — INSULIN ASPART SCH UNITS: 100 INJECTION, SOLUTION INTRAVENOUS; SUBCUTANEOUS at 12:07

## 2018-11-27 NOTE — GENERAL PROGRESS NOTE
Assessment/Plan


Problem List:  


(1) Altered level of consciousness


ICD Codes:  R40.4 - Transient alteration of awareness


SNOMED:  4628153


(2) Hyperglycemia


ICD Codes:  R73.9 - Hyperglycemia, unspecified


SNOMED:  97393024


(3) Weakness


ICD Codes:  R53.1 - Weakness


SNOMED:  49997845


Status:  stable, progressing, tolerating diet


Assessment/Plan


ot pt diet bs bp control dc if clear





Subjective


Constitutional:  Reports: weakness


Allergies:  


Coded Allergies:  


     No Known Allergies (Unverified , 11/21/18)


All Systems:  reviewed and negative except above


Subjective


confused in room





Objective





Last 24 Hour Vital Signs








  Date Time  Temp Pulse Resp B/P (MAP) Pulse Ox O2 Delivery O2 Flow Rate FiO2


 


11/27/18 09:05  71  165/79    


 


11/27/18 08:38      Room Air  


 


11/27/18 08:00 98.6 81 18 150/75 (100) 100   


 


11/27/18 04:17 97.4 80 20 150/72 (98) 100   


 


11/27/18 00:00 97.1 82 20 150/80 (103) 98   


 


11/26/18 20:48      Room Air  


 


11/26/18 16:00 98.9 78 19 135/73 (93) 100   

















Intake and Output  


 


 11/26/18 11/27/18





 19:00 07:00


 


  


 


# Voids  3








Laboratory Tests


11/27/18 05:40: 


White Blood Count 4.9, Red Blood Count 4.05L, Hemoglobin 11.7L, Hematocrit 34.9L

, Mean Corpuscular Volume 86, Mean Corpuscular Hemoglobin 28.9, Mean 

Corpuscular Hemoglobin Concent 33.4, Red Cell Distribution Width 11.1L, 

Platelet Count 174, Mean Platelet Volume 7.0, Neutrophils (%) (Auto) 55.9, 

Lymphocytes (%) (Auto) 33.0, Monocytes (%) (Auto) 7.6, Eosinophils (%) (Auto) 

2.7, Basophils (%) (Auto) 0.8, Sodium Level 136, Potassium Level 4.4, Chloride 

Level 101, Carbon Dioxide Level 28, Anion Gap 7, Blood Urea Nitrogen 27H, 

Creatinine 1.3, Estimat Glomerular Filtration Rate , Glucose Level 401H, 

Calcium Level 8.9, Phosphorus Level 4.4, Magnesium Level 1.4L, Total Bilirubin 

0.3, Aspartate Amino Transf (AST/SGOT) 17, Alanine Aminotransferase (ALT/SGPT) 

35, Alkaline Phosphatase 83, Total Creatine Kinase 61, Total Protein 7.3, 

Albumin 3.2L, Globulin 4.1, Albumin/Globulin Ratio 0.8L


Height (Feet):  5


Height (Inches):  9.00


Weight (Pounds):  158


General Appearance:  lethargic


EENT:  normal ENT inspection


Neck:  normal alignment


Cardiovascular:  normal peripheral pulses, normal rate, regular rhythm


Respiratory/Chest:  chest wall non-tender, lungs clear, normal breath sounds


Abdomen:  normal bowel sounds, non tender, soft


Extremities:  normal inspection


Edema:  no edema noted Arm (L), no edema noted Arm (R), no edema noted Leg (L), 

no edema noted Leg (R), no edema noted Pedal (L), no edema noted Pedal (R), no 

edema noted Generalized


Neurologic:  responsive, motor weakness


Skin:  normal pigmentation, warm/dry











Jason Shane DO Nov 27, 2018 13:21

## 2018-11-27 NOTE — GENERAL PROGRESS NOTE
Assessment/Plan


Problem List:  


(1) Altered level of consciousness


ICD Codes:  R40.4 - Transient alteration of awareness


SNOMED:  5694901


(2) Hyperglycemia


ICD Codes:  R73.9 - Hyperglycemia, unspecified


SNOMED:  52312716


(3) Weakness


ICD Codes:  R53.1 - Weakness


SNOMED:  94850904


Assessment/Plan


continue Levemir 30 units qhs 


continue Novolog 8 units ac tid + SSI ac / hs 


Ok for DC with current insulin order





Subjective


ROS Limited/Unobtainable:  Yes


Allergies:  


Coded Allergies:  


     No Known Allergies (Unverified , 11/21/18)


Subjective


events noted 


non compliant





Objective





Last 24 Hour Vital Signs








  Date Time  Temp Pulse Resp B/P (MAP) Pulse Ox O2 Delivery O2 Flow Rate FiO2


 


11/27/18 12:00 97.6 74 18 152/79 (103) 100   


 


11/27/18 09:05  71  165/79    


 


11/27/18 08:38      Room Air  


 


11/27/18 08:00 98.6 81 18 150/75 (100) 100   


 


11/27/18 04:17 97.4 80 20 150/72 (98) 100   


 


11/27/18 00:00 97.1 82 20 150/80 (103) 98   


 


11/26/18 20:48      Room Air  

















Intake and Output  


 


 11/26/18 11/27/18





 19:00 07:00


 


  


 


# Voids  3








Laboratory Tests


11/27/18 05:40: 


White Blood Count 4.9, Red Blood Count 4.05L, Hemoglobin 11.7L, Hematocrit 34.9L

, Mean Corpuscular Volume 86, Mean Corpuscular Hemoglobin 28.9, Mean 

Corpuscular Hemoglobin Concent 33.4, Red Cell Distribution Width 11.1L, 

Platelet Count 174, Mean Platelet Volume 7.0, Neutrophils (%) (Auto) 55.9, 

Lymphocytes (%) (Auto) 33.0, Monocytes (%) (Auto) 7.6, Eosinophils (%) (Auto) 

2.7, Basophils (%) (Auto) 0.8, Sodium Level 136, Potassium Level 4.4, Chloride 

Level 101, Carbon Dioxide Level 28, Anion Gap 7, Blood Urea Nitrogen 27H, 

Creatinine 1.3, Estimat Glomerular Filtration Rate , Glucose Level 401H, 

Calcium Level 8.9, Phosphorus Level 4.4, Magnesium Level 1.4L, Total Bilirubin 

0.3, Aspartate Amino Transf (AST/SGOT) 17, Alanine Aminotransferase (ALT/SGPT) 

35, Alkaline Phosphatase 83, Total Creatine Kinase 61, Total Protein 7.3, 

Albumin 3.2L, Globulin 4.1, Albumin/Globulin Ratio 0.8L


Height (Feet):  5


Height (Inches):  9.00


Weight (Pounds):  158


General Appearance:  no apparent distress


Neck:  normal alignment


Cardiovascular:  normal rate


Respiratory/Chest:  lungs clear


Abdomen:  normal bowel sounds


Objective





Current Medications








 Medications


  (Trade)  Dose


 Ordered  Sig/Jose


 Route


 PRN Reason  Start Time


 Stop Time Status Last Admin


Dose Admin


 


 Acetaminophen


  (Tylenol)  325 mg  Q4H  PRN


 ORAL


 MILD PAIN / T> 100.5  11/21/18 20:45


 12/21/18 20:44   


 


 


 Atorvastatin


 Calcium


  (Lipitor)  20 mg  BEDTIME


 ORAL


   11/21/18 21:00


 12/21/18 20:59  11/26/18 20:22


 


 


 Bisacodyl


  (Dulcolax)  5 mg  DAILYPRN  PRN


 ORAL


 Constipation  11/21/18 20:45


 12/21/18 20:44   


 


 


 Dextrose


  (Dextrose 50%)  25 ml  Q30M  PRN


 IV


 Hypoglycemia  11/21/18 20:45


 12/21/18 20:44   


 


 


 Dextrose


  (Dextrose 50%)  50 ml  Q30M  PRN


 IV


 Hypoglycemia  11/21/18 20:45


 12/21/18 20:44   


 


 


 Docusate Sodium


  (Colace)  100 mg  DAILY


 ORAL


   11/22/18 09:00


 12/22/18 08:59  11/26/18 08:58


 


 


 Insulin Aspart


  (NovoLOG)    BEFORE MEALS AND  HS


 SUBQ


   11/21/18 22:00


 12/21/18 21:59  11/27/18 12:07


 


 


 Insulin Aspart


  (NovoLOG)  8 units  NOVOTIAC


 SUBQ


   11/22/18 12:50


 12/22/18 12:49  11/27/18 12:08


 


 


 Insulin Detemir


  (Levemir)  30 units  QHS


 SUBQ


   11/24/18 21:00


 12/22/18 13:59  11/25/18 21:44


 


 


 Levothyroxine


 Sodium


  (Synthroid)  100 mcg  ACBREAKFAST


 ORAL


   11/22/18 06:30


 12/22/18 06:29  11/27/18 06:24


 


 


 Lorazepam


  (Ativan)  2 mg  Q6H  PRN


 ORAL


 For Anxiety  11/26/18 13:00


 12/3/18 12:59   


 


 


 Magnesium


 Hydroxide


  (Mom)  30 ml  DAILYPRN  PRN


 ORAL


 Constipation  11/21/18 20:45


 12/21/18 20:44   


 


 


 Metoprolol


 Succinate


  (Toprol XL)  25 mg  DAILY


 ORAL


   11/22/18 09:00


 12/22/18 08:59  11/27/18 09:05


 


 


 Risperidone


  (RisperDAL)  1 mg  BID


 ORAL


   11/26/18 13:00


 12/26/18 12:59  11/27/18 18:19


 


 


 Sennosides


  (Senokot)  8.6 mg  DAILYPRN  PRN


 ORAL


 Constipation  11/21/18 20:45


 12/21/18 20:44   


 


 


 Sodium Phosphate


  (Fleet's Sodium


 Phosl Enema)  133 ml  DAILYPRN  PRN


 RECTAL


 constipation  11/21/18 21:00


 12/21/18 20:59   


 














Item Value  Date Time


 


Bedside Blood Glucose 282 mg/dl H 11/27/18 1702


 


Bedside Blood Glucose 266 mg/dl H 11/27/18 1208


 


Bedside Blood Glucose 377 mg/dl H 11/27/18 0629


 


Bedside Blood Glucose 310 mg/dl H 11/26/18 2048


 


Bedside Blood Glucose 292 mg/dl H 11/26/18 1650

















Monty Hastings MD Nov 27, 2018 18:28

## 2018-11-27 NOTE — PROGRESS NOTE
DATE:  11/27/2018

SUBJECTIVE:  The patient calmer, still confused, not able to answer

questions appropriately.  He was found wandering in the cafeteria, was

brought back to the unit.  The patient is confused and has episodes of

agitation.



MENTAL STATUS EXAMINATION:  The patient is confused, alert and oriented to

self only.  Mood is anxious.  Affect is constricted.  Congruent with mood.

Thought process is concrete.  Thought content, positive for delusions.

Insight and judgment is impaired.



ASSESSMENT:

1. Encephalopathy due to general medical conditions.

2. Psychotic disorder.

3. The patient lacks capacity to make decision.



PLAN:

1. We will continue current medication.

2. Provide the patient with supportive therapy and reality

orientation.









  ______________________________________________

  Theodore Mays M.D.





DR:  Nasreen

D:  11/27/2018 12:49

T:  11/27/2018 18:23

JOB#:  [id number]

CC:

## 2018-11-27 NOTE — PULMONOLOGY PROGRESS NOTE
Assessment/Plan


Problems:  


(1) Altered level of consciousness


(2) Hyperglycemia


(3) Psychosis


(4) Weakness


(5) History of hypertension


Assessment/Plan


awaiting placement


eating well


sliding scale


diabetic diet


psych evaluation


monitor BP


symptomatic treatment


dvt  prophylaxis





Subjective


ROS Limited/Unobtainable:  No


Constitutional:  Reports: no symptoms


HEENT:  Repors: no symptoms


Respiratory:  Reports: no symptoms


Allergies:  


Coded Allergies:  


     No Known Allergies (Unverified , 11/21/18)





Objective





Last 24 Hour Vital Signs








  Date Time  Temp Pulse Resp B/P (MAP) Pulse Ox O2 Delivery O2 Flow Rate FiO2


 


11/27/18 09:05  71  165/79    


 


11/27/18 08:38      Room Air  


 


11/27/18 08:00 98.6 81 18 150/75 (100) 100   


 


11/27/18 04:17 97.4 80 20 150/72 (98) 100   


 


11/27/18 00:00 97.1 82 20 150/80 (103) 98   


 


11/26/18 20:48      Room Air  


 


11/26/18 16:00 98.9 78 19 135/73 (93) 100   

















Intake and Output  


 


 11/26/18 11/27/18





 19:00 07:00


 


  


 


# Voids  3








General Appearance:  WD/WN


HEENT:  normocephalic, anicteric


Respiratory/Chest:  chest wall non-tender, lungs clear


Cardiovascular:  normal peripheral pulses, normal rate


Abdomen:  normal bowel sounds


Extremities:  no cyanosis


Neurologic/Psychiatric:  CNs II-XII grossly normal


Laboratory Tests


11/27/18 05:40: 


White Blood Count 4.9, Red Blood Count 4.05L, Hemoglobin 11.7L, Hematocrit 34.9L

, Mean Corpuscular Volume 86, Mean Corpuscular Hemoglobin 28.9, Mean 

Corpuscular Hemoglobin Concent 33.4, Red Cell Distribution Width 11.1L, 

Platelet Count 174, Mean Platelet Volume 7.0, Neutrophils (%) (Auto) 55.9, 

Lymphocytes (%) (Auto) 33.0, Monocytes (%) (Auto) 7.6, Eosinophils (%) (Auto) 

2.7, Basophils (%) (Auto) 0.8, Sodium Level 136, Potassium Level 4.4, Chloride 

Level 101, Carbon Dioxide Level 28, Anion Gap 7, Blood Urea Nitrogen 27H, 

Creatinine 1.3, Estimat Glomerular Filtration Rate , Glucose Level 401H, 

Calcium Level 8.9, Phosphorus Level 4.4, Magnesium Level 1.4L, Total Bilirubin 

0.3, Aspartate Amino Transf (AST/SGOT) 17, Alanine Aminotransferase (ALT/SGPT) 

35, Alkaline Phosphatase 83, Total Creatine Kinase 61, Total Protein 7.3, 

Albumin 3.2L, Globulin 4.1, Albumin/Globulin Ratio 0.8L





Current Medications








 Medications


  (Trade)  Dose


 Ordered  Sig/Jose


 Route


 PRN Reason  Start Time


 Stop Time Status Last Admin


Dose Admin


 


 Acetaminophen


  (Tylenol)  325 mg  Q4H  PRN


 ORAL


 MILD PAIN / T> 100.5  11/21/18 20:45


 12/21/18 20:44   


 


 


 Atorvastatin


 Calcium


  (Lipitor)  20 mg  BEDTIME


 ORAL


   11/21/18 21:00


 12/21/18 20:59  11/26/18 20:22


 


 


 Bisacodyl


  (Dulcolax)  5 mg  DAILYPRN  PRN


 ORAL


 Constipation  11/21/18 20:45


 12/21/18 20:44   


 


 


 Dextrose


  (Dextrose 50%)  25 ml  Q30M  PRN


 IV


 Hypoglycemia  11/21/18 20:45


 12/21/18 20:44   


 


 


 Dextrose


  (Dextrose 50%)  50 ml  Q30M  PRN


 IV


 Hypoglycemia  11/21/18 20:45


 12/21/18 20:44   


 


 


 Docusate Sodium


  (Colace)  100 mg  DAILY


 ORAL


   11/22/18 09:00


 12/22/18 08:59  11/26/18 08:58


 


 


 Insulin Aspart


  (NovoLOG)    BEFORE MEALS AND  HS


 SUBQ


   11/21/18 22:00


 12/21/18 21:59  11/27/18 12:07


 


 


 Insulin Aspart


  (NovoLOG)  8 units  NOVOTIAC


 SUBQ


   11/22/18 12:50


 12/22/18 12:49  11/27/18 12:08


 


 


 Insulin Detemir


  (Levemir)  30 units  QHS


 SUBQ


   11/24/18 21:00


 12/22/18 13:59  11/25/18 21:44


 


 


 Levothyroxine


 Sodium


  (Synthroid)  100 mcg  ACBREAKFAST


 ORAL


   11/22/18 06:30


 12/22/18 06:29  11/27/18 06:24


 


 


 Lorazepam


  (Ativan)  2 mg  Q6H  PRN


 ORAL


 For Anxiety  11/26/18 13:00


 12/3/18 12:59   


 


 


 Magnesium


 Hydroxide


  (Mom)  30 ml  DAILYPRN  PRN


 ORAL


 Constipation  11/21/18 20:45


 12/21/18 20:44   


 


 


 Metoprolol


 Succinate


  (Toprol XL)  25 mg  DAILY


 ORAL


   11/22/18 09:00


 12/22/18 08:59  11/27/18 09:05


 


 


 Risperidone


  (RisperDAL)  1 mg  BID


 ORAL


   11/26/18 13:00


 12/26/18 12:59  11/27/18 09:00


 


 


 Sennosides


  (Senokot)  8.6 mg  DAILYPRN  PRN


 ORAL


 Constipation  11/21/18 20:45


 12/21/18 20:44   


 


 


 Sodium Phosphate


  (Fleet's Sodium


 Phosl Enema)  133 ml  DAILYPRN  PRN


 RECTAL


 constipation  11/21/18 21:00


 12/21/18 20:59   


 

















Brenton Rosa MD Nov 27, 2018 13:19

## 2018-11-28 NOTE — DISCHARGE SUMMARY
Discharge Summary


Discharge Summary


_


DATE OF ADMISSION: 11/21/2018





DATE OF DISCHARGE: 11/27/2018





REASON FOR ADMISSION: 


73 years old male with past medical history of hypertension, insulin-dependent 

diabetes mellitus, hypothyroidism, hyperlipidemia, anemia, schizophrenia, GERD 

was brought to emergency room for evaluation due to altered level of 

consciousness.


Vital signs revealed  high blood pressure-  170/82, otherwise stable .


Laboratory workup revealed WBC 4.4, hemoglobin 12.5, hematocrit 34.9. 


Sodium 132 ,BUN 19,  creatinine 1.2.  


Troponin negative.  EKG revealed normal sinus rhythm, no  acute ischemic 

changes. 


Urinalysis +4 glucose, no  ketones,  no evidence of UTI.  


Glucose - 457.  Anion gap within normal limits .


Chest x-ray revealed no acute cardiopulmonary pathology.  


Patient admitted with diagnoses of altered  level of consciousness, 

hyperglycemia , hyponatremia, elevated blood pressure. 





CONSULTANTS:


psychiatrist 


endocrinologist 


 


[facility] COURSE: 


Patient  admitted to medical surgical floor.  


Endocrinology consult was requested.  


Blood sugar was managed with long-acting insulin Levemir, pre-meal short acting 

NovoLog and sliding scale  of insulin as needed.  


Hemoglobin A1c 10.9, not at goal .


Blood sugar was improving.


Patient needs further optimization  of anti-glycemic regimen as outpatient.


Blood pressure was managed with beta blocker. 


Statin was continued.  


DVT prophylaxis provided.  


Renal parameters and  electrolytes were closely monitored.  Electrolytes 

corrected as needed.  


Sodium 136 prior to discharge.  


Nephrotoxins were avoided.  


Psychiatrist  seen and evaluated patient.  


Reality orientation  and supportive therapy provided.  


Bowel regimen instituted.


Hemoglobin and hematocrit were closely monitored with goal to keep hemoglobin 

above 7.  Hemoglobin and hematocrit remained stable.  


Synthroid continued.


Patient was working  with physical and occupational therapists.


Supportive care provided.


Patient clinically improved and was stable for transfer back to skilled nursing 

facility for continuation of care





FINAL DIAGNOSES: 


Acute metabolic encephalopathy secondary to hyperglycemia-  resolved


Hyperglycemia


Diabetes mellitus out of control ( with HgA1c-10.5)


Hypertension 


Hyperlipidemia  


Hyponatremia -resolved 





DISCHARGE MEDICATIONS:


See Medication Reconciliation list.





DISCHARGE INSTRUCTIONS:


Patient was discharged to the skilled nursing facility. 


Follow up with medical doctor at the facility.





I have been assigned to dictate discharge summary for this account. I was not 

involved in the patient's management.











Kimi Carlson NP Nov 28, 2018 11:28

## 2023-10-10 NOTE — EMERGENCY ROOM REPORT
History of Present Illness


General


Chief Complaint:  Altered Level of Consciousness


Source:  Medical Record





Present Illness


HPI


Patient presents to ER with complaints of change in mental status


Paramedics report the nursing facility felt the patient had been altered for 

the past one to 2 days





Upon arrival the patient cannot provide history of why he was brought to the 

emergency room


He is speaking clearly reports that he has been to the hospital several times





Denies any chest pain denies any nausea vomiting denies any fevers or chills





Patient was reported to have significantly elevated glucose upon arrival


Allergies:  


Coded Allergies:  


     No Known Allergies (Unverified , 11/21/18)





Patient History


Past Medical History:  see triage record


Pertinent Family History:  none


Reviewed Nursing Documentation:  PMH: Agreed; PSxH: Agreed





Nursing Documentation-PMH


Past Medical History:  No History, Except For


Hx Cardiac Problems:  No - hypothyroidism, anemia, hyperlipidemia


Hx Hypertension:  Yes


Hx Diabetes:  Yes - long term insulin use, muscle weakness


Hx Gastrointestinal Problems:  No - GERD


History Of Psychiatric Problem:  No - schizoprenia, failure to thrive, anxiety,





Review of Systems


All Other Systems:  limited - Other than the ones mentioned in the history of 

present illness all others are reviewed however they do stay limited due to the 

patient's mental status





Physical Exam





Vital Signs








  Date Time  Temp Pulse Resp B/P (MAP) Pulse Ox O2 Delivery O2 Flow Rate FiO2


 


11/21/18 16:09 98.2 84 16 170/82 98 Room Air  








Sp02 EP Interpretation:  reviewed, normal


General Appearance:  no apparent distress


Head:  normocephalic, atraumatic


Eyes:  bilateral eye PERRL, bilateral eye EOMI


ENT:  hearing grossly normal, TMs + canals normal, uvula midline, dry mucus 

membranes


Neck:  full range of motion, supple, no meningismus, no bony tend


Respiratory:  lungs clear, normal breath sounds, no rhonchi, no respiratory 

distress, no retraction, no accessory muscle use


Cardiovascular #1:  normal peripheral pulses, regular rate, rhythm, no edema, 

no gallop, no JVD, no murmur


Gastrointestinal:  normal bowel sounds, non tender, soft, no mass, no 

organomegaly, non-distended, no guarding, no hernia, no pulsatile mass, no 

rebound


Genitourinary:  no CVA tenderness


Musculoskeletal:  normal inspection


Neurologic:  responsive, CNs III-XII nml as tested, motor strength/tone normal, 

sensory intact


Psychiatric:  mood/affect normal


Skin:  normal color, no rash, warm/dry, palpation normal


Lymphatic:  normal inspection, no adenopathy





Medical Decision Making


Diagnostic Impression:  


 Primary Impression:  


 Altered level of consciousness


 Additional Impressions:  


 Weakness


 Hyperglycemia


ER Course


Multiple differentials are considered


Patient requires extensive blood work and acute intervention glucose levels are 

significantly elevated however the patient does not show evidence of acidosis 

or DKA


Further hydration is provided including insulin





Patient placed into admission for further care





Labs








Test


  11/21/18


15:25 11/21/18


17:15


 


White Blood Count


  4.4 K/UL


(4.8-10.8) 


 


 


Red Blood Count


  4.15 M/UL


(4.70-6.10) 


 


 


Hemoglobin


  12.5 G/DL


(14.2-18.0) 


 


 


Hematocrit


  34.9 %


(42.0-52.0) 


 


 


Mean Corpuscular Volume 84 FL (80-99)  


 


Mean Corpuscular Hemoglobin


  30.0 PG


(27.0-31.0) 


 


 


Mean Corpuscular Hemoglobin


Concent 35.7 G/DL


(32.0-36.0) 


 


 


Red Cell Distribution Width


  10.1 %


(11.6-14.8) 


 


 


Platelet Count


  180 K/UL


(150-450) 


 


 


Mean Platelet Volume


  6.5 FL


(6.5-10.1) 


 


 


Neutrophils (%) (Auto)


  59.0 %


(45.0-75.0) 


 


 


Lymphocytes (%) (Auto)


  31.9 %


(20.0-45.0) 


 


 


Monocytes (%) (Auto)


  6.5 %


(1.0-10.0) 


 


 


Eosinophils (%) (Auto)


  1.6 %


(0.0-3.0) 


 


 


Basophils (%) (Auto)


  1.0 %


(0.0-2.0) 


 


 


Sodium Level


  132 MMOL/L


(136-145) 


 


 


Potassium Level


  3.7 MMOL/L


(3.5-5.1) 


 


 


Chloride Level


  97 MMOL/L


() 


 


 


Carbon Dioxide Level


  25 MMOL/L


(21-32) 


 


 


Anion Gap


  10 mmol/L


(5-15) 


 


 


Blood Urea Nitrogen


  19 mg/dL


(7-18) 


 


 


Creatinine


  1.2 MG/DL


(0.55-1.30) 


 


 


Estimat Glomerular Filtration


Rate  mL/min (>60) 


  


 


 


Glucose Level


  457 MG/DL


() 


 


 


Calcium Level


  8.9 MG/DL


(8.5-10.1) 


 


 


Total Bilirubin


  0.2 MG/DL


(0.2-1.0) 


 


 


Aspartate Amino Transf


(AST/SGOT) 14 U/L (15-37) 


  


 


 


Alanine Aminotransferase


(ALT/SGPT) 32 U/L (12-78) 


  


 


 


Alkaline Phosphatase


  106 U/L


() 


 


 


Total Creatine Kinase


  91 U/L


() 


 


 


Creatine Kinase MB


  1.2 NG/ML


(0.0-3.6) 


 


 


Creatine Kinase MB Relative


Index 1.3 


  


 


 


Troponin I


  0.013 ng/mL


(0.000-0.056) 


 


 


Total Protein


  7.8 G/DL


(6.4-8.2) 


 


 


Albumin


  3.4 G/DL


(3.4-5.0) 


 


 


Globulin 4.4 g/dL  


 


Albumin/Globulin Ratio 0.8 (1.0-2.7)  


 


Lipase


  153 U/L


() 


 


 


Urine Color  Pale yellow 


 


Urine Appearance  Clear 


 


Urine pH  6 (4.5-8.0) 


 


Urine Specific Gravity


  


  1.005


(1.005-1.035)


 


Urine Protein


  


  Negative


(NEGATIVE)


 


Urine Glucose (UA)  4+ (NEGATIVE) 


 


Urine Ketones


  


  Negative


(NEGATIVE)


 


Urine Blood


  


  Negative


(NEGATIVE)


 


Urine Nitrite


  


  Negative


(NEGATIVE)


 


Urine Bilirubin


  


  Negative


(NEGATIVE)


 


Urine Urobilinogen


  


  Normal MG/DL


(0.0-1.0)


 


Urine Leukocyte Esterase


  


  Negative


(NEGATIVE)








Rhythm Strip Diag. Results


EP Interpretation:  yes


Rate:  66


Rhythm:  NSR, no PVC's, no ectopy





Chest X-Ray Diagnostic Results


Chest X-Ray Diagnostic Results :  


   Chest X-Ray Ordered:  Yes


   # of Views/Limited/Complete:  1 View


   Indication:  Chest Pain


   EP Interpretation:  Yes


   Interpretation:  no consolidation, no effusion, no pneumothorax


   Impression:  No acute disease


   Electronically Signed by:  Wes Harris DO





Last Vital Signs








  Date Time  Temp Pulse Resp B/P (MAP) Pulse Ox O2 Delivery O2 Flow Rate FiO2


 


11/21/18 16:09 98.2 84 16 170/82 98 Room Air  








Status:  improved


Disposition:  ADMITTED AS INPATIENT


Condition:  Serious











Wes Harris DO Nov 21, 2018 16:27 Protocol: NBUVB Skin Type: III Detail Level: Zone Treatment Number: 135 Total Body Time: 2 min 48 sec Total Body Energy: 1300mj Name Of Supervising Technician: Bella Changes In Treatment Protocol: stayed at the same today Protocol For Nbuvb: The patient received NBUVB. Protocol For Photochemotherapy: Tar And Nbuvb (Goeckerman Treatment): The patient received Photochemotherapy: Tar and NBUVB (Goeckerman treatment). Protocol For Photochemotherapy: Petrolatum And Nbuvb: The patient received Photochemotherapy: Petrolatum and NBUVB (petrolatum applied to all lesions prior to phototherapy). Protocol For Photochemotherapy: Baby Oil And Nbuvb: The patient received Photochemotherapy: Baby Oil and NBUVB (baby oil applied to all lesions prior to phototherapy). Protocol For Photochemotherapy: Mineral Oil And Nbuvb: The patient received Photochemotherapy: Mineral Oil and NBUVB (mineral oil applied to all lesions prior to phototherapy). Protocol For Photochemotherapy: Triamcinolone Ointment And Nbuvb: The patient received Photochemotherapy: Triamcinolone and NBUVB (triamcinolone ointment applied to all lesions prior to phototherapy). Protocol For Photochemotherapy For Severe Photoresponsive Dermatoses: Tar And Nbuvb (Goeckerman Treatment): The patient received Photochemotherapy for severe photoresponsive dermatoses: Tar and NBUVB (Goeckerman treatment) requiring at least 4 to 8 hours of care under direct physician supervision. Protocol For Photochemotherapy For Severe Photoresponsive Dermatoses: Petrolatum And Nbuvb: The patient received Photochemotherapy for severe photoresponsive dermatoses: Petrolatum and NBUVB requiring at least 4 to 8 hours of care under direct physician supervision. Protocol For Nb Uva: The patient received NB UVA. Protocol For Uva: The patient received UVA. Protocol For Uva1: The patient received UVA1. Protocol For Broad Band Uvb: The patient received Broad Band UVB. Protocol For Photochemotherapy: Tar And Broad Band Uvb (Goeckerman Treatment): The patient received Photochemotherapy: Tar and Broad Band UVB (Goeckerman treatment). Protocol For Photochemotherapy: Petrolatum And Broad Band Uvb: The patient received Photochemotherapy: Petrolatum and Broad Band UVB. Protocol For Photochemotherapy: Mineral Oil And Broad Band Uvb: The patient received Photochemotherapy: Mineral Oil and Broad Band UVB. Protocol For Photochemotherapy For Severe Photoresponsive Dermatoses: Tar And Broad Band Uvb (Goeckerman Treatment): The patient received Photochemotherapy for severe photoresponsive dermatoses: Tar and Broad Band UVB (Goeckerman treatment) requiring at least 4 to 8 hours of care under direct physician supervision. Protocol For Photochemotherapy For Severe Photoresponsive Dermatoses: Petrolatum And Broad Band Uvb: The patient received Photochemotherapyfor severe photoresponsive dermatoses: Petrolatum and Broad Band UVB requiring at least 4 to 8 hours of care under direct physician supervision. Protocol For Puva: The patient received PUVA. Protocol For Photochemotherapy For Severe Photoresponsive Dermatoses: Puva: The patient received Photochemotherapy for severe photoresponsive dermatoses: PUVA requiring at least 4 to 8 hours of care under direct physician supervision. Protocol For Bath Puva: The patient received Bath PUVA. Protocol For Protocol For Photochemotherapy For Severe Photoresponsive Dermatoses: Bath Puva: The patient received Photochemotherapy for severe photoresponsive dermatoses: Bath PUVA requiring at least 4 to 8 hours of care under direct physician supervision. Consent: Written consent obtained.  The risks were reviewed with the patient including but not limited to: burn, pigmentary changes, pain, blistering, scabbing, redness, increased risk of skin cancers, and the remote possibility of scarring. Render Post-Care In The Note: no Post-Care Instructions: I reviewed with the patient in detail post-care instructions. Patient is to wear sun protection. Patients may expect sunburn like redness, discomfort and scabbing.